# Patient Record
Sex: FEMALE | Race: BLACK OR AFRICAN AMERICAN | URBAN - METROPOLITAN AREA
[De-identification: names, ages, dates, MRNs, and addresses within clinical notes are randomized per-mention and may not be internally consistent; named-entity substitution may affect disease eponyms.]

---

## 2019-06-24 ENCOUNTER — OFFICE VISIT (OUTPATIENT)
Dept: SURGERY | Facility: CLINIC | Age: 54
End: 2019-06-24
Payer: COMMERCIAL

## 2019-06-24 ENCOUNTER — HOSPITAL ENCOUNTER (OUTPATIENT)
Dept: GENERAL RADIOLOGY | Facility: HOSPITAL | Age: 54
Discharge: HOME | End: 2019-06-24
Attending: PLASTIC SURGERY
Payer: COMMERCIAL

## 2019-06-24 DIAGNOSIS — S63.501A SPRAIN OF RIGHT WRIST, INITIAL ENCOUNTER: ICD-10-CM

## 2019-06-24 DIAGNOSIS — M79.641 PAIN IN RIGHT HAND: ICD-10-CM

## 2019-06-24 DIAGNOSIS — M79.641 PAIN IN RIGHT HAND: Primary | ICD-10-CM

## 2019-06-24 PROCEDURE — 73110 X-RAY EXAM OF WRIST: CPT | Performed by: PLASTIC SURGERY

## 2019-06-24 PROCEDURE — 99212 OFFICE O/P EST SF 10 MIN: CPT | Performed by: PLASTIC SURGERY

## 2019-06-24 PROCEDURE — 99243 OFF/OP CNSLTJ NEW/EST LOW 30: CPT | Performed by: PLASTIC SURGERY

## 2019-06-24 RX ORDER — IBUPROFEN 200 MG
200 TABLET ORAL
COMMUNITY

## 2019-06-24 RX ORDER — ASCORBATE CALCIUM 500 MG
TABLET ORAL
COMMUNITY

## 2019-06-24 RX ORDER — CHLORAL HYDRATE 500 MG
CAPSULE ORAL
COMMUNITY

## 2019-06-24 RX ORDER — UBIDECARENONE 75 MG
CAPSULE ORAL
COMMUNITY

## 2019-06-24 NOTE — H&P
Domingo Perez is a 48year old female that presents with Patient presents with:  Ganglion: R wrist   .    REFERRED BY:   Chato Raines      Pacemaker: No  Latex Allergy: no  Coumadin: No  Plavix: No  Other anticoagulants: No  Cardiac stents: No    HAND DOMIN Rfl:        Allergies:   No Known Allergies    Past Medical History:   Diagnosis Date   • High blood pressure      Past Surgical History:   Procedure Laterality Date   • HYSTERECTOMY  02/2011   • TONSILLECTOMY  06/1993     Social History    Socioeconomic H Regular use of sun block: Not Asked    Social History Narrative      Not on file    History reviewed. No pertinent family history.     PHYSICAL EXAM:   CONSTITUTIONAL: Overall appearance - Normal  HEENT: Normocephalic  EYES: Conjunctiva - Right: Normal,

## 2019-06-24 NOTE — PROGRESS NOTES
Per Dr. Meri Rojas' evaluation, applied a medium R wrist cock-up splint. Pt instructed to adjust to her comfort. Verbalized understanding.

## 2019-08-08 ENCOUNTER — OFFICE VISIT (OUTPATIENT)
Dept: SURGERY | Facility: CLINIC | Age: 54
End: 2019-08-08
Payer: COMMERCIAL

## 2019-08-08 DIAGNOSIS — M79.641 PAIN IN RIGHT HAND: Primary | ICD-10-CM

## 2019-08-08 DIAGNOSIS — S63.501A SPRAIN OF RIGHT WRIST, INITIAL ENCOUNTER: ICD-10-CM

## 2019-08-08 PROCEDURE — 99212 OFFICE O/P EST SF 10 MIN: CPT | Performed by: PLASTIC SURGERY

## 2019-08-08 NOTE — PROGRESS NOTES
Injury 1: Right wrist ulnar pain while extending against resistance  - Date: 06/13/19  - Days Since: 56    Pt here for follow up of injury to right wrist  \"Feels much better\"   Continues to wear splint at night for comfort  Pt denies pain describes as \"

## 2022-02-18 ENCOUNTER — OFFICE VISIT (OUTPATIENT)
Dept: OTHER | Facility: HOSPITAL | Age: 57
End: 2022-02-18
Attending: EMERGENCY MEDICINE

## 2022-02-18 DIAGNOSIS — Z77.21 PERSONAL HISTORY OF EXPOSURE TO POTENTIALLY HAZARDOUS BODY FLUIDS: Primary | ICD-10-CM

## 2022-02-18 LAB
HBV SURFACE AB SER QL: REACTIVE
HBV SURFACE AB SERPL IA-ACNC: >1000 MIU/ML
HCV AB SERPL QL IA: NONREACTIVE

## 2022-02-18 PROCEDURE — 86803 HEPATITIS C AB TEST: CPT

## 2022-02-18 PROCEDURE — 86706 HEP B SURFACE ANTIBODY: CPT

## 2022-02-18 PROCEDURE — 87389 HIV-1 AG W/HIV-1&-2 AB AG IA: CPT

## (undated) NOTE — LETTER
19      Patient: Frankey Slice  : 8/10/1965 Visit date: 2019    Dear Barbara Anna,      I examined your patient in follow-up today. She has been under treatment for right achiness after work.   Physical exam is normal.    Instructed he

## (undated) NOTE — LETTER
19      Patient: Geoff Patel  : 8/10/1965 Visit date: 2019    Dear Uri Cevallos,      I examined your patient in consultation today. She has a ulnar wrist sprain, but no ganglion. X-ray today is negative.   Her tendon and ligament